# Patient Record
Sex: FEMALE | Race: WHITE | NOT HISPANIC OR LATINO | Employment: UNEMPLOYED | URBAN - METROPOLITAN AREA
[De-identification: names, ages, dates, MRNs, and addresses within clinical notes are randomized per-mention and may not be internally consistent; named-entity substitution may affect disease eponyms.]

---

## 2021-04-15 DIAGNOSIS — Z23 ENCOUNTER FOR IMMUNIZATION: ICD-10-CM

## 2022-01-11 ENCOUNTER — TELEPHONE (OUTPATIENT)
Dept: OBGYN CLINIC | Facility: HOSPITAL | Age: 42
End: 2022-01-11

## 2022-01-11 NOTE — TELEPHONE ENCOUNTER
Patient lvm to rs her appt on Friday with Dr Ti Alves  I called patient back but got vm  I asked her to cb to schedule

## 2022-01-14 ENCOUNTER — APPOINTMENT (OUTPATIENT)
Dept: RADIOLOGY | Facility: CLINIC | Age: 42
End: 2022-01-14
Payer: COMMERCIAL

## 2022-01-14 ENCOUNTER — OFFICE VISIT (OUTPATIENT)
Dept: OBGYN CLINIC | Facility: CLINIC | Age: 42
End: 2022-01-14
Payer: COMMERCIAL

## 2022-01-14 VITALS
SYSTOLIC BLOOD PRESSURE: 111 MMHG | BODY MASS INDEX: 23.22 KG/M2 | WEIGHT: 123 LBS | DIASTOLIC BLOOD PRESSURE: 76 MMHG | HEART RATE: 94 BPM | HEIGHT: 61 IN

## 2022-01-14 DIAGNOSIS — M25.561 RIGHT KNEE PAIN, UNSPECIFIED CHRONICITY: ICD-10-CM

## 2022-01-14 DIAGNOSIS — S76.311A HAMSTRING STRAIN, RIGHT, INITIAL ENCOUNTER: ICD-10-CM

## 2022-01-14 DIAGNOSIS — M79.89 PALPABLE MASS OF SOFT TISSUE OF KNEE: Primary | ICD-10-CM

## 2022-01-14 DIAGNOSIS — M25.562 LEFT KNEE PAIN, UNSPECIFIED CHRONICITY: ICD-10-CM

## 2022-01-14 PROCEDURE — 73562 X-RAY EXAM OF KNEE 3: CPT

## 2022-01-14 PROCEDURE — 99204 OFFICE O/P NEW MOD 45 MIN: CPT | Performed by: ORTHOPAEDIC SURGERY

## 2022-01-14 RX ORDER — ESTRADIOL 0.04 MG/D
FILM, EXTENDED RELEASE TRANSDERMAL
COMMUNITY
Start: 2021-11-05

## 2022-01-14 NOTE — PROGRESS NOTES
Assessment/Plan:  1  Palpable mass of soft tissue of knee  XR knee 3 vw left non injury    US MSK limited   2  Hamstring strain, right, initial encounter  XR knee 3 vw right non injury     Patricio Jones has left-sided knee pain consistent with a patellar contusion  She does have a palpable lump over the anterior aspect of the patella an increased soft tissue swelling on her x-ray  There is no evidence of patellar fracture on x-ray  Do think it is possible that she had a ganglion cyst on the anterior portion of her patella  There is a chance that she may have hit this area on her recent fall and ruptured the cyst   She is concerned that this keeps happening for her and is interested in having this cyst removed soda longer does this  I discussed with her that this may resolve on its own she could continue with ice and compression and anti-inflammatories for now  We will proceed with an ultrasound of her patella to confirm the presence of the ganglion cyst   If this continues to bother her and we do confirm its presence then perhaps removal would be warranted  She also has discomfort in the popliteal space of her right knee that is not significantly tender on examination today  She has no pain on exam and has no signs of instability  Her x-rays are within normal limits  Is likely that she feels pain along her hamstring and this should resolve with conservative treatment  The pain worsens we could consider further imaging  Subjective:   Ryan Ochoa is a 42 y o  female who presents to the office for evaluation left-sided knee pain  She states that she has had a long history of a palpable mass on the anterior aspect of her left knee it felt like a cyst   This would bother her from time to time she would ever hit it or [place] it  She does not remember when it first began but it was many years ago  She recently had a fall where she landed directly on her kneecap    She had severe discomfort of her left knee and difficulty bending her knee for 2-3 days  She states that the motion is improved but she still has pain on the anterior aspect of the patella  She feels like the cyst is not as palpable as it was in the past   Today she has aching throbbing pain over the anterior aspect of the left knee that worsens with palpation  He denies any effusion of the knee  She denies any mechanical symptoms of locking or catching in her knee  She also has mild discomfort in her right knee  She states she occasionally has some pain to the posterior aspect of the right knee that seems inconsistent  She walks on a regular basis for exercise but denies any recent injury to her right knee  Pain seems to come and go  She denies any locking or catching or instability in the right knee  She denies any swelling the right knee  She denies any previous injury to the right knee  Review of Systems   Constitutional: Negative for chills, fever and unexpected weight change  HENT: Negative for hearing loss, nosebleeds and sore throat  Eyes: Negative for pain, redness and visual disturbance  Respiratory: Negative for cough, shortness of breath and wheezing  Cardiovascular: Negative for chest pain, palpitations and leg swelling  Gastrointestinal: Negative for abdominal pain, nausea and vomiting  Endocrine: Negative for polydipsia and polyuria  Genitourinary: Negative for dysuria and hematuria  Musculoskeletal:        See HPI   Skin: Negative for rash and wound  Neurological: Negative for dizziness, numbness and headaches  Psychiatric/Behavioral: Negative for decreased concentration and suicidal ideas  The patient is not nervous/anxious  History reviewed  No pertinent past medical history      Past Surgical History:   Procedure Laterality Date    HYSTERECTOMY         Family History   Problem Relation Age of Onset    No Known Problems Mother     No Known Problems Father        Social History     Occupational History    Not on file   Tobacco Use    Smoking status: Former Smoker    Smokeless tobacco: Never Used   Vaping Use    Vaping Use: Never used   Substance and Sexual Activity    Alcohol use: Yes    Drug use: Never    Sexual activity: Not on file         Current Outpatient Medications:     estradiol (VIVELLE-DOT) 0 0375 MG/24HR, APPLY 1 PATCH ON TO THE SKIN TWICE A WEEK, Disp: , Rfl:     No Known Allergies    Objective:  Vitals:    01/14/22 0815   BP: 111/76   Pulse: 94       Right Knee Exam     Range of Motion   Extension: normal   Flexion: normal     Tests   Pranav:  Medial - negative Lateral - negative  Varus: negative Valgus: negative    Other   Erythema: absent  Sensation: normal  Pulse: present  Swelling: none  Effusion: no effusion present      Left Knee Exam     Tenderness   The patient is experiencing tenderness in the patella (Small painful palpable lump anterior to the patella concerning for ganglion cyst   Mild soft tissue swelling)  Range of Motion   Extension: normal   Flexion: normal     Tests   Pranav:  Medial - negative Lateral - negative  Varus: negative Valgus: negative    Other   Erythema: absent  Sensation: normal  Pulse: present  Swelling: none  Effusion: no effusion present          Observations   Left Knee   Negative for effusion  Right Knee   Negative for effusion  Physical Exam  Vitals reviewed  Constitutional:       Appearance: She is well-developed  HENT:      Head: Normocephalic and atraumatic  Eyes:      Conjunctiva/sclera: Conjunctivae normal       Pupils: Pupils are equal, round, and reactive to light  Cardiovascular:      Rate and Rhythm: Normal rate  Pulmonary:      Effort: Pulmonary effort is normal  No respiratory distress  Musculoskeletal:      Cervical back: Normal range of motion and neck supple  Right knee: No effusion  Instability Tests: Medial Pranav test negative and lateral Pranav test negative  Left knee: No effusion  Instability Tests: Medial Pranav test negative and lateral Pranav test negative  Skin:     General: Skin is warm and dry  Neurological:      Mental Status: She is alert and oriented to person, place, and time  Psychiatric:         Behavior: Behavior normal          I have personally reviewed pertinent films in PACS and my interpretation is as follows: Three-view x-rays of the left knee demonstrates evidence of acute fracture  Soft tissue swelling anterior to the patella  Three-view x-rays of the right demonstrates no evidence of acute fracture or significant degenerative changes

## 2022-01-15 ENCOUNTER — DOCUMENTATION (OUTPATIENT)
Dept: OBGYN CLINIC | Facility: CLINIC | Age: 42
End: 2022-01-15

## 2022-01-15 DIAGNOSIS — F41.9 ANXIETY: Primary | ICD-10-CM

## 2022-01-15 RX ORDER — ALPRAZOLAM 0.25 MG/1
0.25 TABLET ORAL 2 TIMES DAILY PRN
Qty: 15 TABLET | Refills: 0 | Status: SHIPPED | OUTPATIENT
Start: 2022-01-15

## 2024-10-04 ENCOUNTER — OFFICE VISIT (OUTPATIENT)
Dept: PHYSICAL THERAPY | Facility: CLINIC | Age: 44
End: 2024-10-04
Payer: COMMERCIAL

## 2024-10-04 DIAGNOSIS — S76.012D MUSCLE STRAIN OF LEFT GLUTEAL REGION, SUBSEQUENT ENCOUNTER: ICD-10-CM

## 2024-10-04 DIAGNOSIS — M25.552 LEFT HIP PAIN: Primary | ICD-10-CM

## 2024-10-04 PROCEDURE — 97161 PT EVAL LOW COMPLEX 20 MIN: CPT

## 2024-10-04 NOTE — PROGRESS NOTES
PT Evaluation     Today's date: 10/4/2024  Patient name: Preethi Mojica  : 1980  MRN: [id number]  Referring provider: Self, Referral  Dx:   Encounter Diagnosis     ICD-10-CM    1. Left hip pain  M25.552       2. Muscle strain of left gluteal region, subsequent encounter  S76.012D           Start Time: 0800  Stop Time: 0845  Total time in clinic (min): 45 minutes    Assessment  Impairments: abnormal gait, abnormal or restricted ROM, activity intolerance, impaired physical strength, lacks appropriate home exercise program, pain with function, poor body mechanics, participation limitations and activity limitations  Symptom irritability: moderate    Assessment details: Preethi Mojica is a 44 y.o. female presenting with acute onset of left hip pain that began yesterday after playing pickleball. Pt denies any injury or abnormal event during pickleball, however began having pain a few hours after playing. Pt is currently presenting with pain, decreased ROM with empty end feel, decreased strength, gait dysfunction, altered mechanics with functional movements, increased soft tissue tension and tenderness, and activity intolerance. Pt functional limitations include walking, IADLs, putting on socks and shoes, sport and recreational activity, and sleeping. Pt clinical presentation is consistent with likely muscle strain of gluteal region and associated acute inflammation. Educated on gentle stretching, management of acute inflammation with modalities as necessary, and activity modification during this phase of injury. Discussed plan of care to include stretching, STM, and gradual loading of hip musculature to promote return to activity with reduced risk of recurrence. Preethi Mojica would benefit from skilled physical therapy services to address aforementioned impairments, reduce pain, promote return to prior level of function without limitation, and improve overall quality of life.     Understanding of Dx/Px/POC: good      Prognosis: good    Goals  STG 2-4 weeks  1. Patient will be independent with HEP.   2. Patient will demonstrate improvement in hip ER ROM by 25%  3. Patient will demonstrate improvement in hip rotation strength by 1/2 MMT   4. Patient will report 0/10 pain     LTG 6-8 weeks   1. Patient will be able to sleep on her side for at least 6-8 hours without waking due to pain  2. Patient will demonstrate at least 50% improvement in hip ER ROM without pain  3. Patient will be able to return to all recreational activity and sport without limitation   4. Patient will be able to walk for at least one mile without increase in pain       Plan  Patient would benefit from: PT eval and skilled physical therapy  Planned modality interventions: cryotherapy, electrical stimulation/Russian stimulation and thermotherapy: hydrocollator packs    Planned therapy interventions: joint mobilization, manual therapy, neuromuscular re-education, IASTM, patient/caregiver education, strengthening, stretching, therapeutic activities, therapeutic exercise, home exercise program, flexibility, body mechanics training and abdominal trunk stabilization    Frequency: 1-2x/week.  Duration in weeks: 8  Plan of Care beginning date: 10/4/2024  Plan of Care expiration date: 11/29/2024  Treatment plan discussed with: patient  Plan details: HEP development, stretching, strengthening, A/AA/PROM, joint mobilizations, posture education, STM/MI as needed to reduce muscle tension, muscle reeducation, PLOC discussed and agreed upon with patient.          Subjective Evaluation    History of Present Illness  Mechanism of injury: Preethi Mojica reports her left hip started bothering her yesterday after playing pickleball. Pt denies any injury or abnormal event while playing. Pt states that a few hours after playing she started having pain throughout the posterior aspect of her left hip and was having trouble walking, sleeping, and putting her socks on. Pt reports she  had pain sitting and going up the stairs that is still present today, however a little bit improved.     Patient Goals  Patient goals for therapy: increased strength, decreased pain and return to sport/leisure activities    Pain  Current pain ratin  At best pain ratin  At worst pain ratin  Quality: dull ache, sharp and tight  Relieving factors: ice, heat and rest  Aggravating factors: walking    Social Support  Steps to enter house: yes  Stairs in house: yes   Lives in: multiple-level home    Exercise history: pickleball 3x a week, walking          Objective     Palpation   Left   Muscle spasm in the gluteus medius and piriformis.   Tenderness of the gluteus mitzi, gluteus medius and piriformis.     Tenderness     Left Hip   No tenderness in the ASIS, PSIS and greater trochanter.     Lumbar Screen  Lumbar range of motion within normal limits.    Active Range of Motion   Left Hip   Flexion: WFL  External rotation (90/90): 30 degrees with pain  Internal rotation (90/90): 32 degrees     Right Hip   Flexion: WFL  External rotation (90/90): WFL  Internal rotation (90/90): WFL    Additional Active Range of Motion Details  ER/IR seated     Passive Range of Motion   Left Hip   Flexion: WFL  External rotation (90/90): 34 (empty end feel) degrees with pain  Internal rotation (90/90): 38 degrees     Right Hip   Flexion: WFL  External rotation (90/90): 60 degrees   Internal rotation (90/90): 32 degrees     Additional Passive Range of Motion Details  ER/IR supine     Strength/Myotome Testing     Left Hip   Planes of Motion   Flexion: 4+  Abduction: 4+  External rotation: 4-  Internal rotation: 4-    Right Hip   Normal muscle strength    Tests     Left Hip   Positive ANGELA.   Negative FADIR and scour.   SLR: Negative.     Ambulation     Observational Gait   Gait: antalgic   Decreased left stance time.     Quality of Movement During Gait   Trunk    Trunk (Left): Positive left lateral lean over stance limb.      Pelvis    Pelvis (Left): Positive Trendelenburg (mild).     Functional Assessment      Squat    Pain and sitting toward right side.              Precautions: none        Insurance:  AMA/CMS Eval/ Re-eval Auth #/ Referral # Total units or visits Start date  Expiration date KX? Visit limitation?  PT only or  PT+OT? Co-Insurance   CMS 10/4/24 IE Not required     30   $50 copay                  POC Start Date POC Expiration Date Signed POC?   10/4/2024 11/29/2024 pending      Date               Visits/Units:  Used               Authed:  Remaining                        Date    10/4   Visit Number    1   Manuals                                   Neuro Re-Ed       Glute sets       Bridges        Sidelying clamshell                                   Ther Ex       HEP    Initiated    Education & Management     Gentle stretching, management of acute inflammatory phase   ANGELA str    Without OP 30s hold   Hip IR/ER    20x                               Ther Activity                     Gait Training                     Modalities

## 2024-10-07 ENCOUNTER — OFFICE VISIT (OUTPATIENT)
Dept: PHYSICAL THERAPY | Facility: CLINIC | Age: 44
End: 2024-10-07
Payer: COMMERCIAL

## 2024-10-07 DIAGNOSIS — S76.012D MUSCLE STRAIN OF LEFT GLUTEAL REGION, SUBSEQUENT ENCOUNTER: ICD-10-CM

## 2024-10-07 DIAGNOSIS — M25.552 LEFT HIP PAIN: Primary | ICD-10-CM

## 2024-10-07 PROCEDURE — 97112 NEUROMUSCULAR REEDUCATION: CPT

## 2024-10-07 PROCEDURE — 97110 THERAPEUTIC EXERCISES: CPT

## 2024-10-07 NOTE — PROGRESS NOTES
"Daily Note     Today's date: 10/7/2024  Patient name: Preethi Mojica  : 1980  MRN: [id number]  Referring provider: Self, Referral  Dx:   Encounter Diagnosis     ICD-10-CM    1. Left hip pain  M25.552       2. Muscle strain of left gluteal region, subsequent encounter  S76.012D           Start Time: 1400  Stop Time: 1445  Total time in clinic (min): 45 minutes    Subjective: Preethi reports her hip is feeling improved since initial evaluation. Pt states she went on a walk this morning and played pickleball with minimal issue or increase in pain.       Objective: See treatment diary below      Assessment: Tolerated treatment well. Pt showed improved tolerance to hip ER PROM today and was able to show slight increase in ROM prior to onset of discomfort. Focused on loading lateral glute with pt requiring intermittent cueing to maximize contraction and minimize compensatory strategies. Patient demonstrated fatigue post treatment, exhibited good technique with therapeutic exercises, and would benefit from continued PT      Plan: Continue per plan of care.  Progress treatment as tolerated.       Precautions: none        Insurance:  AMA/CMS Eval/ Re-eval Auth #/ Referral # Total units or visits Start date  Expiration date KX? Visit limitation?  PT only or  PT+OT? Co-Insurance   CMS 10/4/24 IE Not required     30   $50 copay                  POC Start Date POC Expiration Date Signed POC?   10/4/2024 2024 pending      Date               Visits/Units:  Used               Authed:  Remaining                        Date   10/7 10/4   Visit Number   2 1   Manuals                                   Neuro Re-Ed       Glute sets       Bridges    2x10     Sidelying clamshell   3x10 L     SLS   2x10s hold    Quadruped hip ext   2x10 L     Squat    2x10     SL RDL   2x10     Step up   6\" 2x10 w/ cues for lateral glute engagement     Ther Ex       HEP    Initiated    Education & Management     Gentle stretching, management of " acute inflammatory phase   ANGELA str    Without OP 30s hold   Hip IR/ER    20x   Piriformis str   PT assisted 3x30s     Glute med str   PT assisted 3x30s                  Ther Activity                     Gait Training                     Modalities

## 2024-10-10 ENCOUNTER — OFFICE VISIT (OUTPATIENT)
Dept: PHYSICAL THERAPY | Facility: CLINIC | Age: 44
End: 2024-10-10
Payer: COMMERCIAL

## 2024-10-10 DIAGNOSIS — S76.012D MUSCLE STRAIN OF LEFT GLUTEAL REGION, SUBSEQUENT ENCOUNTER: ICD-10-CM

## 2024-10-10 DIAGNOSIS — M25.552 LEFT HIP PAIN: Primary | ICD-10-CM

## 2024-10-10 PROCEDURE — 97110 THERAPEUTIC EXERCISES: CPT

## 2024-10-10 PROCEDURE — 97112 NEUROMUSCULAR REEDUCATION: CPT

## 2024-10-10 NOTE — PROGRESS NOTES
"Daily Note     Today's date: 10/10/2024  Patient name: Preethi Mojica  : 1980  MRN: [id number]  Referring provider: Self, Referral  Dx:   Encounter Diagnosis     ICD-10-CM    1. Left hip pain  M25.552       2. Muscle strain of left gluteal region, subsequent encounter  S76.012D           Start Time: 0800  Stop Time: 0845  Total time in clinic (min): 45 minutes    Subjective: Preethi reports she had minimal soreness after previous session, however had an increase in hip pain on Tuesday. Pt states she took a few rest days from activity due to pain and was able to do light yoga and walking.       Objective: See treatment diary below      Assessment: Tolerated treatment well. Pt demonstrates continued soft tissue tension through L lateral gluteal region. Progressed loading of glute med today with focus on L hip stability; pt was able to perform all exercises with minimal pain and appropriate muscle fatigue. Patient demonstrated fatigue post treatment, exhibited good technique with therapeutic exercises, and would benefit from continued PT      Plan: Continue per plan of care.  Progress treatment as tolerated.       Precautions: none        Insurance:  AMA/CMS Eval/ Re-eval Auth #/ Referral # Total units or visits Start date  Expiration date KX? Visit limitation?  PT only or  PT+OT? Co-Insurance   CMS 10/4/24 IE Not required     30   $50 copay                  POC Start Date POC Expiration Date Signed POC?   10/4/2024 2024 pending      Date               Visits/Units:  Used               Authed:  Remaining                        Date  10/10 10/7 10/4   Visit Number  3 2 1   Manuals                                   Neuro Re-Ed       Glute sets       Bridges   2x10 w/ RLE lift 5s hold  2x10     Sidelying clamshell  Standing w/ RTB 15x ea  3x10 L     SLS   2x10s hold    Quadruped hip ext   2x10 L     Squat    2x10     SL RDL  2x10 L  2x10     Step up   6\" 2x10 w/ cues for lateral glute engagement     Side plank "  15x ea side w/ LE lift      Ther Ex       HEP    Initiated    Education & Management     Gentle stretching, management of acute inflammatory phase   ANGELA str  3x30s   Without OP 30s hold   Hip IR/ER    20x   Piriformis str  PT assisted 3x30s PT assisted 3x30s     Glute med str  PT assisted 3x30s  PT assisted 3x30s                  Ther Activity                     Gait Training                     Modalities

## 2024-10-14 ENCOUNTER — OFFICE VISIT (OUTPATIENT)
Dept: PHYSICAL THERAPY | Facility: CLINIC | Age: 44
End: 2024-10-14
Payer: COMMERCIAL

## 2024-10-14 DIAGNOSIS — S76.012D MUSCLE STRAIN OF LEFT GLUTEAL REGION, SUBSEQUENT ENCOUNTER: ICD-10-CM

## 2024-10-14 DIAGNOSIS — M25.552 LEFT HIP PAIN: Primary | ICD-10-CM

## 2024-10-14 PROCEDURE — 97110 THERAPEUTIC EXERCISES: CPT

## 2024-10-14 PROCEDURE — 97112 NEUROMUSCULAR REEDUCATION: CPT

## 2024-10-14 NOTE — PROGRESS NOTES
"Daily Note     Today's date: 10/14/2024  Patient name: Preethi Mojica  : 1980  MRN: [id number]  Referring provider: Self, Referral  Dx:   Encounter Diagnosis     ICD-10-CM    1. Left hip pain  M25.552       2. Muscle strain of left gluteal region, subsequent encounter  S76.012D           Start Time: 0845  Stop Time: 930  Total time in clinic (min): 45 minutes    Subjective: Preethi reports her hip is feeling much better overall. Pt states she was able to play pickleball and go on a walk this weekend without increase in pain.       Objective: See treatment diary below      Assessment: Tolerated treatment well. Preethi presented today with improvement in pain levels and symptom irritability. Progressed LE strengthening and hip stability today with pt experiencing appropriate difficulty and muscle engagement. Patient demonstrated fatigue post treatment, exhibited good technique with therapeutic exercises, and would benefit from continued PT      Plan: Continue per plan of care.  Progress treatment as tolerated.       Precautions: none        Insurance:  AMA/CMS Eval/ Re-eval Auth #/ Referral # Total units or visits Start date  Expiration date KX? Visit limitation?  PT only or  PT+OT? Co-Insurance   CMS 10/4/24 IE Not required     30   $50 copay                  POC Start Date POC Expiration Date Signed POC?   10/4/2024 2024 pending      Date               Visits/Units:  Used               Authed:  Remaining                        Date 10/14 10/10 10/7 10/4   Visit Number 4 3 2 1   Manuals                                   Neuro Re-Ed       Glute sets       Bridges  10x warmup     2x10 SL  2x10 w/ RLE lift 5s hold  2x10     Sidelying clamshell Standing w/ RTB 15x ea  Standing w/ RTB 15x ea  3x10 L     SLS W/ pallof press roxanne 2.0 15x ea way  2x10s hold    Quadruped hip ext   2x10 L     Squat  Single leg eccentric to chair 2x10   2x10     SL RDL 2x10 L 7lbs 2x10 L  2x10     Step up 8\" 2x10 w/ 10lb weight   " "6\" 2x10 w/ cues for lateral glute engagement     Side plank 15x ea side w/ LE lift  15x ea side w/ LE lift             Ther Ex       HEP    Initiated    Education & Management     Gentle stretching, management of acute inflammatory phase   ANGELA str  3x30s   Without OP 30s hold   Hip IR/ER    20x   Piriformis str PT assisted 3x30s PT assisted 3x30s PT assisted 3x30s     Glute med str PT assisted 3x30s  PT assisted 3x30s  PT assisted 3x30s                  Ther Activity                     Gait Training                     Modalities                              "

## 2024-10-17 ENCOUNTER — OFFICE VISIT (OUTPATIENT)
Dept: PHYSICAL THERAPY | Facility: CLINIC | Age: 44
End: 2024-10-17
Payer: COMMERCIAL

## 2024-10-17 DIAGNOSIS — S76.012D MUSCLE STRAIN OF LEFT GLUTEAL REGION, SUBSEQUENT ENCOUNTER: ICD-10-CM

## 2024-10-17 DIAGNOSIS — M25.552 LEFT HIP PAIN: Primary | ICD-10-CM

## 2024-10-17 PROCEDURE — 97112 NEUROMUSCULAR REEDUCATION: CPT

## 2024-10-17 PROCEDURE — 97110 THERAPEUTIC EXERCISES: CPT

## 2024-10-17 NOTE — PROGRESS NOTES
Daily Note     Today's date: 10/17/2024  Patient name: Preethi Mojica  : 1980  MRN: [id number]  Referring provider: Self, Referral  Dx:   Encounter Diagnosis     ICD-10-CM    1. Left hip pain  M25.552       2. Muscle strain of left gluteal region, subsequent encounter  S76.012D           Start Time: 1315  Stop Time: 1400  Total time in clinic (min): 45 minutes    Subjective: Preethi reports she felt a bit of glute pain when playing pickleball this morning.       Objective: See treatment diary below      Assessment: Tolerated treatment well. Pt continues to show gradual improvement in lateral hip extensibility and tolerance to passive range of motion and stretching. Continued to focus on loading lateral glute with pt tolerating well and experiencing appropriate muscle engagement with minimal discomfort.  Patient demonstrated fatigue post treatment, exhibited good technique with therapeutic exercises, and would benefit from continued PT      Plan: Continue per plan of care.  Progress treatment as tolerated.       Precautions: none        Insurance:  AMA/CMS Eval/ Re-eval Auth #/ Referral # Total units or visits Start date  Expiration date KX? Visit limitation?  PT only or  PT+OT? Co-Insurance   CMS 10/4/24 IE Not required     30   $50 copay                  POC Start Date POC Expiration Date Signed POC?   10/4/2024 2024 pending      Date               Visits/Units:  Used               Authed:  Remaining                        Date 10/17 10/14 10/10 10/7 10/4   Visit Number 5 4 3 2 1   Manuals                                        Neuro Re-Ed        Glute sets        Bridges  10x warmup     2x10 SL 10x warmup     2x10 SL  2x10 w/ RLE lift 5s hold  2x10     Sidelying clamshell 15x  Standing w/ RTB 15x ea  Standing w/ RTB 15x ea  3x10 L     SLS  W/ pallof press roxanne 2.0 15x ea way  2x10s hold    Quadruped hip ext    2x10 L     Squat   Single leg eccentric to chair 2x10   2x10     SL RDL 2x10 roxanne 2.0   "2x10 L 7lbs 2x10 L  2x10     Step up  8\" 2x10 w/ 10lb weight   6\" 2x10 w/ cues for lateral glute engagement     Side plank 15x ea w/ LE lift RTB  15x ea side w/ LE lift  15x ea side w/ LE lift      Lunges  2x10 ea 7lbs        Hip hikes Off 6\" step 2x10 ea        Ther Ex        HEP     Initiated    Education & Management      Gentle stretching, management of acute inflammatory phase   ANGELA str   3x30s   Without OP 30s hold   Hip IR/ER     20x   Piriformis str PT assisted 3x30s    3x30s figure 4 PT assisted 3x30s PT assisted 3x30s PT assisted 3x30s     Glute med str PT assisted 3x30s  PT assisted 3x30s  PT assisted 3x30s  PT assisted 3x30s                    Ther Activity                        Gait Training                        Modalities                                   "

## 2024-10-24 ENCOUNTER — OFFICE VISIT (OUTPATIENT)
Dept: PHYSICAL THERAPY | Facility: CLINIC | Age: 44
End: 2024-10-24
Payer: COMMERCIAL

## 2024-10-24 DIAGNOSIS — S76.012D MUSCLE STRAIN OF LEFT GLUTEAL REGION, SUBSEQUENT ENCOUNTER: ICD-10-CM

## 2024-10-24 DIAGNOSIS — M25.552 LEFT HIP PAIN: Primary | ICD-10-CM

## 2024-10-24 PROCEDURE — 97110 THERAPEUTIC EXERCISES: CPT

## 2024-10-24 PROCEDURE — 97140 MANUAL THERAPY 1/> REGIONS: CPT

## 2024-10-24 NOTE — PROGRESS NOTES
Daily Note     Today's date: 10/24/2024  Patient name: Preethi Mojica  : 1980  MRN: [id number]  Referring provider: Self, Referral  Dx:   Encounter Diagnosis     ICD-10-CM    1. Left hip pain  M25.552       2. Muscle strain of left gluteal region, subsequent encounter  S76.012D           Start Time: 1315  Stop Time: 1400  Total time in clinic (min): 45 minutes    Subjective: Preethi reports her hip has been feeling good since previous session. Pt states she has been playing pickleball and walking consistently without increase in pain. Pt states the left side of her upper back and neck are really bothering her today, causing her to have trouble moving her head.      Objective: See treatment diary below      Assessment: Tolerated treatment well. Pt presented today with discomfort throughout L cervical and thoracic region. She demonstrates increased soft tissue tension through L UT and L levator scap, as well as decreased L lateral flexion and rotation. Pt showed improvement in ROM and experienced reduction of pain level following manual intervention. Reviewed thoracic mobility and stretching with pt demonstrating good understanding and technique. Patient exhibited good technique with therapeutic exercises and would benefit from continued PT      Plan: Continue per plan of care.  Progress treatment as tolerated.       Precautions: none        Insurance:  AMA/CMS Eval/ Re-eval Auth #/ Referral # Total units or visits Start date  Expiration date KX? Visit limitation?  PT only or  PT+OT? Co-Insurance   CMS 10/4/24 IE Not required     30   $50 copay                  POC Start Date POC Expiration Date Signed POC?   10/4/2024 2024 pending      Date               Visits/Units:  Used               Authed:  Remaining                        Date 10/24 10/17 10/14 10/10 10/7 10/4   Visit Number 6 5 4 3 2 1   Manuals         STM L cervical         Thoracic PAs Gr II-III                           Neuro Re-Ed        "  Glute sets         Bridges   10x warmup     2x10 SL 10x warmup     2x10 SL  2x10 w/ RLE lift 5s hold  2x10     Sidelying clamshell  15x  Standing w/ RTB 15x ea  Standing w/ RTB 15x ea  3x10 L     SLS   W/ pallof press roxanne 2.0 15x ea way  2x10s hold    Quadruped hip ext     2x10 L     Squat    Single leg eccentric to chair 2x10   2x10     SL RDL  2x10 roxanne 2.0  2x10 L 7lbs 2x10 L  2x10     Step up   8\" 2x10 w/ 10lb weight   6\" 2x10 w/ cues for lateral glute engagement     Side plank  15x ea w/ LE lift RTB  15x ea side w/ LE lift  15x ea side w/ LE lift      Lunges   2x10 ea 7lbs        Hip hikes  Off 6\" step 2x10 ea        Ther Ex         HEP      Initiated    Education & Management  Assessment of cervical and thoracic spine     Gentle stretching, management of acute inflammatory phase   ANGELA str    3x30s   Without OP 30s hold   Hip IR/ER      20x   Piriformis str  PT assisted 3x30s    3x30s figure 4 PT assisted 3x30s PT assisted 3x30s PT assisted 3x30s     Glute med str  PT assisted 3x30s  PT assisted 3x30s  PT assisted 3x30s  PT assisted 3x30s    Cat cow 20x        UT str  4x30s PT assisted         Thoracic rot  With foam roller 10x ea         Ther Activity                           Gait Training                           Modalities                                        "

## 2024-10-28 ENCOUNTER — OFFICE VISIT (OUTPATIENT)
Dept: PHYSICAL THERAPY | Facility: CLINIC | Age: 44
End: 2024-10-28
Payer: COMMERCIAL

## 2024-10-28 DIAGNOSIS — M25.552 LEFT HIP PAIN: Primary | ICD-10-CM

## 2024-10-28 DIAGNOSIS — S76.012D MUSCLE STRAIN OF LEFT GLUTEAL REGION, SUBSEQUENT ENCOUNTER: ICD-10-CM

## 2024-10-28 PROCEDURE — 97110 THERAPEUTIC EXERCISES: CPT

## 2024-10-28 PROCEDURE — 97140 MANUAL THERAPY 1/> REGIONS: CPT

## 2024-10-28 NOTE — PROGRESS NOTES
Daily Note     Today's date: 10/28/2024  Patient name: Preethi Mojica  : 1980  MRN: [id number]  Referring provider: Self, Referral  Dx:   Encounter Diagnosis     ICD-10-CM    1. Left hip pain  M25.552       2. Muscle strain of left gluteal region, subsequent encounter  S76.012D           Start Time: 1315  Stop Time: 1355  Total time in clinic (min): 40 minutes    Subjective: Preethi reports her hip continues to feel significantly improved. Pt states her upper back/neck felt great on Friday after previous session, however is a little bothersome today.      Objective: See treatment diary below      Assessment: Tolerated treatment well. Focused session on cervical and thoracic region today. Pt demonstrated improvement in soft tissue tension and tenderness, as well as increased cervical ROM into lateral flexion and rotation. Patient would benefit from continued PT      Plan: Continue per plan of care.  Progress treatment as tolerated.       Precautions: none        Insurance:  AMA/CMS Eval/ Re-eval Auth #/ Referral # Total units or visits Start date  Expiration date KX? Visit limitation?  PT only or  PT+OT? Co-Insurance   CMS 10/4/24 IE Not required     30   $50 copay                  POC Start Date POC Expiration Date Signed POC?   10/4/2024 2024 pending      Date               Visits/Units:  Used               Authed:  Remaining                        Date 10/28 10/24 10/17 10/14 10/10 10/7 10/4   Visit Number 7 6 5 4 3 2 1   Manuals          STM L cervical  L cervical         Thoracic PAs Gr II-III Gr II-III                             Neuro Re-Ed          Glute sets          Bridges    10x warmup     2x10 SL 10x warmup     2x10 SL  2x10 w/ RLE lift 5s hold  2x10     Sidelying clamshell   15x  Standing w/ RTB 15x ea  Standing w/ RTB 15x ea  3x10 L     SLS    W/ pallof press roxanne 2.0 15x ea way  2x10s hold    Quadruped hip ext      2x10 L     Squat     Single leg eccentric to chair 2x10   2x10     SL RDL  "  2x10 roxanne 2.0  2x10 L 7lbs 2x10 L  2x10     Step up    8\" 2x10 w/ 10lb weight   6\" 2x10 w/ cues for lateral glute engagement     Side plank   15x ea w/ LE lift RTB  15x ea side w/ LE lift  15x ea side w/ LE lift      Lunges    2x10 ea 7lbs        Hip hikes   Off 6\" step 2x10 ea        Ther Ex          HEP       Initiated    Education & Management   Assessment of cervical and thoracic spine     Gentle stretching, management of acute inflammatory phase   ANGELA str     3x30s   Without OP 30s hold   Hip IR/ER       20x   Piriformis str   PT assisted 3x30s    3x30s figure 4 PT assisted 3x30s PT assisted 3x30s PT assisted 3x30s     Glute med str   PT assisted 3x30s  PT assisted 3x30s  PT assisted 3x30s  PT assisted 3x30s    Cat cow  20x        UT str   4x30s PT assisted         Thoracic rot  W/ foam roller 10x ea  With foam roller 10x ea         Ther Activity                              Gait Training                              Modalities                                             "

## 2024-10-31 ENCOUNTER — OFFICE VISIT (OUTPATIENT)
Dept: PHYSICAL THERAPY | Facility: CLINIC | Age: 44
End: 2024-10-31
Payer: COMMERCIAL

## 2024-10-31 DIAGNOSIS — S76.012D MUSCLE STRAIN OF LEFT GLUTEAL REGION, SUBSEQUENT ENCOUNTER: ICD-10-CM

## 2024-10-31 DIAGNOSIS — M25.552 LEFT HIP PAIN: Primary | ICD-10-CM

## 2024-10-31 PROCEDURE — 97140 MANUAL THERAPY 1/> REGIONS: CPT

## 2024-10-31 PROCEDURE — 97110 THERAPEUTIC EXERCISES: CPT

## 2024-10-31 NOTE — PROGRESS NOTES
"Daily Note     Today's date: 10/31/2024  Patient name: Preethi Mojica  : 1980  MRN: [id number]  Referring provider: Self, Referral  Dx:   Encounter Diagnosis     ICD-10-CM    1. Left hip pain  M25.552       2. Muscle strain of left gluteal region, subsequent encounter  S76.012D           Start Time: 1400  Stop Time: 1445  Total time in clinic (min): 45 minutes    Subjective: Preethi reports her hip continues to do well and she has had minimal pain over the past week. Pt states her neck and upper back have been improving; she was able to perform neck circles last night.      Objective: See treatment diary below      Assessment: Tolerated treatment well. Focused session on cervical and thoracic region again today due to persistent symptoms, however she is showing gradual improvement in soft tissue tension and ROM. Patient would benefit from continued PT      Plan: Continue per plan of care.  Progress treatment as tolerated.       Precautions: none        Insurance:  AMA/CMS Eval/ Re-eval Auth #/ Referral # Total units or visits Start date  Expiration date KX? Visit limitation?  PT only or  PT+OT? Co-Insurance   CMS 10/4/24 IE Not required     30   $50 copay                  POC Start Date POC Expiration Date Signed POC?   10/4/2024 2024 pending      Date               Visits/Units:  Used               Authed:  Remaining                        Date 10/31 10/28 10/24 10/17 10/14   Visit Number 8 7 6 5 4   Manuals        STM L cervical  L cervical  L cervical      Thoracic PAs Gr II-III Gr II-III Gr II-III                      Neuro Re-Ed        Glute sets        Bridges     10x warmup     2x10 SL 10x warmup     2x10 SL    Sidelying clamshell    15x  Standing w/ RTB 15x ea    SLS     W/ pallof press roxanne 2.0 15x ea way   Quadruped hip ext        Squat      Single leg eccentric to chair 2x10    SL RDL    2x10 roxanne 2.0  2x10 L 7lbs   Step up     8\" 2x10 w/ 10lb weight    Side plank    15x ea w/ LE lift RTB  " "15x ea side w/ LE lift    Lunges     2x10 ea 7lbs     Hip hikes    Off 6\" step 2x10 ea     Ther Ex        HEP        Education & Management    Assessment of cervical and thoracic spine     ANGELA str        Hip IR/ER        Piriformis str    PT assisted 3x30s    3x30s figure 4 PT assisted 3x30s   Glute med str    PT assisted 3x30s  PT assisted 3x30s    Cat cow 10x   20x     UT str    4x30s PT assisted      Thoracic rot   W/ foam roller 10x ea  With foam roller 10x ea      Ther Activity                        Gait Training                        Modalities                                         "

## 2024-11-06 ENCOUNTER — APPOINTMENT (OUTPATIENT)
Dept: PHYSICAL THERAPY | Facility: CLINIC | Age: 44
End: 2024-11-06
Payer: COMMERCIAL

## 2024-11-13 ENCOUNTER — OFFICE VISIT (OUTPATIENT)
Dept: PHYSICAL THERAPY | Facility: CLINIC | Age: 44
End: 2024-11-13
Payer: COMMERCIAL

## 2024-11-13 DIAGNOSIS — S76.012D MUSCLE STRAIN OF LEFT GLUTEAL REGION, SUBSEQUENT ENCOUNTER: ICD-10-CM

## 2024-11-13 DIAGNOSIS — M25.552 LEFT HIP PAIN: Primary | ICD-10-CM

## 2024-11-13 PROCEDURE — 97110 THERAPEUTIC EXERCISES: CPT

## 2024-11-13 PROCEDURE — 97140 MANUAL THERAPY 1/> REGIONS: CPT

## 2024-11-13 NOTE — PROGRESS NOTES
Daily Note     Today's date: 2024  Patient name: Preethi Mojica  : 1980  MRN: [id number]  Referring provider: Self, Referral  Dx:   Encounter Diagnosis     ICD-10-CM    1. Left hip pain  M25.552       2. Muscle strain of left gluteal region, subsequent encounter  S76.012D           Start Time: 1145  Stop Time: 1230  Total time in clinic (min): 45 minutes    Subjective: Preethi reports her hip continues to bother her intermittently when playing pickle ball, but easily resolves with stretches. Pt reports her upper back and neck pain is gradually improving.       Objective: See treatment diary below      Assessment: Tolerated treatment well. Discussed reducing frequency of physical therapy as she is showing excellent independence with home exercise program and understanding of management. Pt continues to respond well to joint mobilizations and mobility in combination with STM and stretching to address soft tissue restrictions.  Patient exhibited good technique with therapeutic exercises and would benefit from continued PT      Plan: Continue per plan of care.  Progress treatment as tolerated.       Precautions: none        Insurance:  AMA/CMS Eval/ Re-eval Auth #/ Referral # Total units or visits Start date  Expiration date KX? Visit limitation?  PT only or  PT+OT? Co-Insurance   CMS 10/4/24 IE Not required     30   $50 copay                  POC Start Date POC Expiration Date Signed POC?   10/4/2024 2024 pending      Date               Visits/Units:  Used               Authed:  Remaining                        Date 11/13 10/31 10/28 10/24 10/17 10/14   Visit Number 9 8 7 6 5 4   Manuals         STM L cervical  L cervical  L cervical  L cervical      Thoracic PAs Gr II-III Gr II-III Gr II-III Gr II-III                        Neuro Re-Ed         Glute sets         Bridges      10x warmup     2x10 SL 10x warmup     2x10 SL    Sidelying clamshell     15x  Standing w/ RTB 15x ea    SLS      W/ pallof  "press roxanne 2.0 15x ea way   Quadruped hip ext         Squat       Single leg eccentric to chair 2x10    SL RDL     2x10 roxanne 2.0  2x10 L 7lbs   Step up      8\" 2x10 w/ 10lb weight    Side plank     15x ea w/ LE lift RTB  15x ea side w/ LE lift    Lunges      2x10 ea 7lbs     Hip hikes     Off 6\" step 2x10 ea     Ther Ex         HEP         Education & Management  Frequency of stretching, reducing frequency of PT    Assessment of cervical and thoracic spine     ANGELA str         Hip IR/ER         Piriformis str     PT assisted 3x30s    3x30s figure 4 PT assisted 3x30s   Glute med str     PT assisted 3x30s  PT assisted 3x30s    Cat cow 15x 10x   20x     UT str  10x10\"   4x30s PT assisted      Thoracic rot  W/ foam roller 20x ea side   W/ foam roller 10x ea  With foam roller 10x ea      Ther Activity                           Gait Training                           Modalities                                              "

## 2024-12-17 ENCOUNTER — OFFICE VISIT (OUTPATIENT)
Dept: PHYSICAL THERAPY | Facility: CLINIC | Age: 44
End: 2024-12-17
Payer: COMMERCIAL

## 2024-12-17 DIAGNOSIS — M54.2 NECK PAIN: ICD-10-CM

## 2024-12-17 DIAGNOSIS — M54.6 LEFT-SIDED THORACIC BACK PAIN, UNSPECIFIED CHRONICITY: Primary | ICD-10-CM

## 2024-12-17 PROCEDURE — 97161 PT EVAL LOW COMPLEX 20 MIN: CPT

## 2024-12-17 NOTE — PROGRESS NOTES
PT Evaluation     Today's date: 2024  Patient name: Preethi Mojica  : 1980  MRN: [id number]  Referring provider: Self, Referral  Dx:   Encounter Diagnosis     ICD-10-CM    1. Left-sided thoracic back pain, unspecified chronicity  M54.6       2. Neck pain  M54.2             Start Time: 1445  Stop Time: 1530  Total time in clinic (min): 45 minutes    Assessment  Impairments: abnormal or restricted ROM, lacks appropriate home exercise program, pain with function, poor posture , poor body mechanics, participation limitations and activity limitations  Symptom irritability: low    Assessment details: Preethi Mojica is a 44 y.o. female presenting with left sided neck, thoracic, and scapular pain that began without known cause. She has been experiencing pain intermittently for the past few months, and frequently experiences worsening during periods of stress and change of sleep environment. Pt is currently presenting with thoracic hypomobility, increased soft tissue tension and tenderness of left periscapular and cervical musculature, mild postural dysfunction and ROM deficits. Pt functional limitations include sleeping, activities that require her to rotate her head, recreational activities, and driving. Educated on plan of care to include thoracic mobility, soft tissue mobilization, and postural strengthening. Preethi Mojica would benefit from skilled physical therapy services to address aforementioned impairments, reduce pain, promote return to prior level of function without limitation, and improve overall quality of life.     Understanding of Dx/Px/POC: good     Prognosis: good    Goals  STG 2-4 weeks  1. Patient will be independent with HEP.   2. Patient will improve cervical and thoracic ROM by at least one grade of restriction in all deficient planes.   3. Patient will reduce reported pain level by 1-2 grades       LTG 6-8 weeks   1. Patient will be able to sleep 6-8 hours through the night without waking  due to pain  2. Patient will demonstrate cervical and thoracic ROM to WFL without pain  3. Patient will be able to rotate her head left without pain to promote safety with driving   4. Patient will be able to participate in all recreational activities without onset of pain      Plan  Patient would benefit from: PT eval and skilled physical therapy  Planned modality interventions: cryotherapy, electrical stimulation/Russian stimulation and thermotherapy: hydrocollator packs    Planned therapy interventions: joint mobilization, manual therapy, neuromuscular re-education, IASTM, patient/caregiver education, strengthening, stretching, therapeutic activities, therapeutic exercise, home exercise program, flexibility, body mechanics training and abdominal trunk stabilization    Frequency: 1-2x/week.  Duration in weeks: 8  Plan of Care beginning date: 2024  Plan of Care expiration date: 2025  Treatment plan discussed with: patient  Plan details: HEP development, stretching, strengthening, A/AA/PROM, joint mobilizations, posture education, STM/MI as needed to reduce muscle tension, muscle reeducation, PLOC discussed and agreed upon with patient.          Subjective Evaluation    History of Present Illness  Mechanism of injury: Preethi Mojica presents with left sided neck, thoracic, and scapular pain that has been intermittent for the past few months. She reports when the pain gets more intense, she frequently has trouble moving and turning her head. She reports noticing her pain increases when she is stressed, and most recently worsened due to sleeping in hospital chairs.     Patient Goals  Patient goals for therapy: increased strength, decreased pain and return to sport/leisure activities    Pain  Current pain ratin  At best pain ratin  At worst pain ratin  Quality: dull ache, sharp and tight  Relieving factors: heat and rest  Exacerbated by: stress, poor sleeping position.    Social Support  Steps to  enter house: yes  Stairs in house: yes   Lives in: multiple-level home    Exercise history: pickleball 3x a week, walking          Objective     Static Posture     Scapulae  Left protracted.    Postural Observations  Seated posture: good  Standing posture: fair  Correction of posture: makes symptoms better      Palpation   Left   Hypertonic in the levator scapulae and upper trapezius.   Tenderness of the levator scapulae, middle trapezius, rhomboids and upper trapezius.     Tenderness   Cervical Spine   Tenderness in the left scapula (medial aspect) and left ribs (with mobilization).     Active Range of Motion   Cervical/Thoracic Spine       Cervical    Flexion:  WFL  Extension:  WFL  Left lateral flexion:  Restriction level: minimal  Right lateral flexion:  Restriction level moderate  Left rotation:  Restriction level: moderate  Right rotation:  WFL    Thoracic    Flexion:  Restriction level: minimal  Extension:  Restriction level: minimal  Left lateral flexion:  Restriction level: minimal  Right lateral flexion:  Restriction level: minimal  Left rotation:  Restriction level: minimal  Right rotation:  Restriction level: minimal    Scapular Mobility   Left Shoulder   Scapular mobility: fair    Joint Play     Hypomobile: T3, T4, T5 and T6     Strength/Myotome Testing     Additional Strength Details  UE strength WFL             Precautions: none        Insurance:  AMA/CMS Eval/ Re-eval Auth #/ Referral # Total units or visits Start date  Expiration date KX? Visit limitation?  PT only or  PT+OT? Co-Insurance   CMS 12/17 IE Not required     30   $50 copay                  POC Start Date POC Expiration Date Signed POC?   12/17/24 2/11/2025 pending      Date               Visits/Units:  Used               Authed:  Remaining                          Date    12/17   Visit Number    1   Manuals       STM       Thoracic PAs                     Neuro Re-Ed                                          Ther Ex       HEP    Initiated  "   Education & Management     POC   Cat cow    10x   UT str     10x10\"   Thoracic rot     10x   Ther Activity                     Gait Training                     Modalities                            "

## 2025-01-07 ENCOUNTER — APPOINTMENT (OUTPATIENT)
Dept: PHYSICAL THERAPY | Facility: CLINIC | Age: 45
End: 2025-01-07
Payer: COMMERCIAL

## 2025-01-28 ENCOUNTER — OFFICE VISIT (OUTPATIENT)
Dept: PHYSICAL THERAPY | Facility: CLINIC | Age: 45
End: 2025-01-28
Payer: COMMERCIAL

## 2025-01-28 DIAGNOSIS — M54.6 LEFT-SIDED THORACIC BACK PAIN, UNSPECIFIED CHRONICITY: Primary | ICD-10-CM

## 2025-01-28 DIAGNOSIS — M54.2 NECK PAIN: ICD-10-CM

## 2025-01-28 PROCEDURE — 97140 MANUAL THERAPY 1/> REGIONS: CPT

## 2025-01-28 NOTE — PROGRESS NOTES
"Daily Note     Today's date: 2025  Patient name: Preetih Mojica  : 1980  MRN: [id number]  Referring provider: Self, Referral  Dx:   Encounter Diagnosis     ICD-10-CM    1. Left-sided thoracic back pain, unspecified chronicity  M54.6       2. Neck pain  M54.2           Start Time: 1445  Stop Time: 1530  Total time in clinic (min): 45 minutes    Subjective: Pt reports her pain has been improving overall, however she does continue to notice intermittent discomfort that radiates from her neck to the medial aspect of her left shoulder blade.       Objective: See treatment diary below      Assessment: Tolerated treatment well. Pt continues with decreased thoracic mobility and soft tissue tension through right cervical and periscapular region. She responded well to manual intervention, demonstrating improvement in L cervical rotation and reduced pain level by end of session. Patient would benefit from continued PT      Plan: Continue per plan of care.  Progress treatment as tolerated.       Precautions: none        Insurance:  AMA/CMS Eval/ Re-eval Auth #/ Referral # Total units or visits Start date  Expiration date KX? Visit limitation?  PT only or  PT+OT? Co-Insurance   CMS  IE Not required     30   $50 copay                  POC Start Date POC Expiration Date Signed POC?   24 pending      Date               Visits/Units:  Used               Authed:  Remaining                          Date      Visit Number   2 1   Manuals       STM   Cervical & L periscapular     Thoracic PAs   Gr II-III                  Neuro Re-Ed                                          Ther Ex       HEP    Initiated    Education & Management     POC   Cat cow    10x   UT str     10x10\"   Thoracic rot     10x   Ther Activity                     Gait Training                     Modalities                            "